# Patient Record
Sex: FEMALE | URBAN - METROPOLITAN AREA
[De-identification: names, ages, dates, MRNs, and addresses within clinical notes are randomized per-mention and may not be internally consistent; named-entity substitution may affect disease eponyms.]

---

## 2021-07-26 ENCOUNTER — OFFICE VISIT (OUTPATIENT)
Dept: URBAN - METROPOLITAN AREA CLINIC 58 | Facility: CLINIC | Age: 51
End: 2021-07-26
Payer: OTHER GOVERNMENT

## 2021-07-26 DIAGNOSIS — H43.811 VITREOUS DEGENERATION, RIGHT EYE: Primary | ICD-10-CM

## 2021-07-26 DIAGNOSIS — H25.13 AGE-RELATED NUCLEAR CATARACT, BILATERAL: ICD-10-CM

## 2021-07-26 PROCEDURE — 99203 OFFICE O/P NEW LOW 30 MIN: CPT | Performed by: OPHTHALMOLOGY

## 2021-07-26 ASSESSMENT — VISUAL ACUITY
OS: 20/20
OD: 20/25

## 2021-07-26 ASSESSMENT — INTRAOCULAR PRESSURE
OD: 16
OS: 16

## 2021-08-23 ENCOUNTER — OFFICE VISIT (OUTPATIENT)
Dept: URBAN - METROPOLITAN AREA CLINIC 58 | Facility: CLINIC | Age: 51
End: 2021-08-23
Payer: OTHER GOVERNMENT

## 2021-08-23 PROCEDURE — 99213 OFFICE O/P EST LOW 20 MIN: CPT | Performed by: OPHTHALMOLOGY

## 2021-08-23 ASSESSMENT — INTRAOCULAR PRESSURE
OD: 13
OS: 14

## 2022-08-23 ENCOUNTER — OFFICE VISIT (OUTPATIENT)
Dept: URBAN - METROPOLITAN AREA CLINIC 58 | Facility: CLINIC | Age: 52
End: 2022-08-23
Payer: COMMERCIAL

## 2022-08-23 DIAGNOSIS — H52.4 PRESBYOPIA: Primary | ICD-10-CM

## 2022-08-23 DIAGNOSIS — H43.811 VITREOUS DETACHMENT OF RIGHT EYE: ICD-10-CM

## 2022-08-23 DIAGNOSIS — H25.13 AGE-RELATED NUCLEAR CATARACT, BILATERAL: ICD-10-CM

## 2022-08-23 PROCEDURE — 92004 COMPRE OPH EXAM NEW PT 1/>: CPT | Performed by: OPTOMETRIST

## 2022-08-23 ASSESSMENT — VISUAL ACUITY
OD: 20/20
OS: 20/20

## 2022-08-23 ASSESSMENT — INTRAOCULAR PRESSURE
OS: 15
OD: 14

## 2022-08-23 NOTE — IMPRESSION/PLAN
Impression: Age-related nuclear cataract, bilateral: H25.13. Plan: Discussed diagnosis in detail with patient. No treatment is required at this time. Will continue to observe condition and or symptoms. Call if [address] worsens.

## 2022-08-23 NOTE — IMPRESSION/PLAN
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 Plan: Discussed diagnosis with Pt. There is no evidence of retinal breaks. Discussed S/S of RD. Pt to RTC if they occur.

## 2022-10-18 ENCOUNTER — APPOINTMENT (RX ONLY)
Dept: URBAN - METROPOLITAN AREA CLINIC 145 | Facility: CLINIC | Age: 52
Setting detail: DERMATOLOGY
End: 2022-10-18

## 2022-10-18 DIAGNOSIS — L43.8 OTHER LICHEN PLANUS: ICD-10-CM | Status: INADEQUATELY CONTROLLED

## 2022-10-18 DIAGNOSIS — L40.0 PSORIASIS VULGARIS: ICD-10-CM

## 2022-10-18 PROCEDURE — ? COUNSELING

## 2022-10-18 PROCEDURE — 99204 OFFICE O/P NEW MOD 45 MIN: CPT

## 2022-10-18 PROCEDURE — ? PRESCRIPTION

## 2022-10-18 PROCEDURE — ? PRESCRIPTION MEDICATION MANAGEMENT

## 2022-10-18 RX ORDER — FLUOCINONIDE GEL 0.5 MG/G
GEL TOPICAL
Qty: 30 | Refills: 0 | Status: ERX | COMMUNITY
Start: 2022-10-18

## 2022-10-18 RX ADMIN — FLUOCINONIDE GEL: 0.5 GEL TOPICAL at 00:00

## 2022-10-18 ASSESSMENT — LOCATION SIMPLE DESCRIPTION DERM
LOCATION SIMPLE: POSTERIOR SCALP
LOCATION SIMPLE: RIGHT BUCCAL MUCOSA
LOCATION SIMPLE: LEFT MAXILLARY GINGIVA
LOCATION SIMPLE: LEFT BUCCAL MUCOSA
LOCATION SIMPLE: LEFT MANDIBULAR GINGIVA

## 2022-10-18 ASSESSMENT — LOCATION DETAILED DESCRIPTION DERM
LOCATION DETAILED: MID-OCCIPITAL SCALP
LOCATION DETAILED: LEFT MEDIAL MAXILLARY GINGIVA
LOCATION DETAILED: LEFT MEDIAL MANDIBULAR GINGIVA
LOCATION DETAILED: RIGHT MIDDLE SUPERIOR BUCCAL MUCOSA
LOCATION DETAILED: LEFT MIDDLE SUPERIOR BUCCAL MUCOSA

## 2022-10-18 ASSESSMENT — LOCATION ZONE DERM
LOCATION ZONE: ORAL_CAVITY
LOCATION ZONE: SCALP

## 2022-10-18 NOTE — PROCEDURE: COUNSELING
Patient Specific Counseling (Will Not Stick From Patient To Patient): Patient is currently treating with tar shampoo and betamethasone solution. She already has both.
Detail Level: Detailed
Detail Level: Simple

## 2022-10-18 NOTE — PROCEDURE: PRESCRIPTION MEDICATION MANAGEMENT
Render In Strict Bullet Format?: No
Plan: Apply fluocinonide gel with her finger TID to the gums and buccal mucosa. Follow up in 1 month. Consider tacrolimus ointment and cyclosporine solution (swish and spit) if fluocinonide gel or tacrolimus are ineffective. Oral isotretinoin has also been reported to help with oral lichen planus & may be considered as a last resort.
Detail Level: Zone
Initiate Treatment: Fluocinonide 0.05% gel

## 2022-12-05 ENCOUNTER — APPOINTMENT (RX ONLY)
Dept: URBAN - METROPOLITAN AREA CLINIC 145 | Facility: CLINIC | Age: 52
Setting detail: DERMATOLOGY
End: 2022-12-05

## 2022-12-05 DIAGNOSIS — L43.8 OTHER LICHEN PLANUS: ICD-10-CM | Status: IMPROVED

## 2022-12-05 PROCEDURE — ? COUNSELING

## 2022-12-05 PROCEDURE — 99214 OFFICE O/P EST MOD 30 MIN: CPT

## 2022-12-05 PROCEDURE — ? PRESCRIPTION

## 2022-12-05 PROCEDURE — ? PRESCRIPTION MEDICATION MANAGEMENT

## 2022-12-05 RX ORDER — CLOBETASOL PROPIONATE 0.5 MG/G
OINTMENT TOPICAL
Qty: 60 | Refills: 0 | Status: ERX | COMMUNITY
Start: 2022-12-05

## 2022-12-05 RX ORDER — TACROLIMUS 1 MG/G
OINTMENT TOPICAL
Qty: 60 | Refills: 1 | Status: ERX | COMMUNITY
Start: 2022-12-05

## 2022-12-05 RX ADMIN — TACROLIMUS: 1 OINTMENT TOPICAL at 00:00

## 2022-12-05 RX ADMIN — CLOBETASOL PROPIONATE: 0.5 OINTMENT TOPICAL at 00:00

## 2022-12-05 ASSESSMENT — LOCATION SIMPLE DESCRIPTION DERM
LOCATION SIMPLE: LEFT BUCCAL MUCOSA
LOCATION SIMPLE: LEFT MANDIBULAR GINGIVA
LOCATION SIMPLE: RIGHT BUCCAL MUCOSA
LOCATION SIMPLE: LEFT MAXILLARY GINGIVA
LOCATION SIMPLE: LABIA MAJORA

## 2022-12-05 ASSESSMENT — LOCATION DETAILED DESCRIPTION DERM
LOCATION DETAILED: LEFT MEDIAL MANDIBULAR GINGIVA
LOCATION DETAILED: LEFT MIDDLE SUPERIOR BUCCAL MUCOSA
LOCATION DETAILED: LEFT LABIUM MAJUS
LOCATION DETAILED: LEFT MEDIAL MAXILLARY GINGIVA
LOCATION DETAILED: RIGHT LABIUM MAJUS
LOCATION DETAILED: RIGHT MIDDLE SUPERIOR BUCCAL MUCOSA

## 2022-12-05 ASSESSMENT — LOCATION ZONE DERM
LOCATION ZONE: ORAL_CAVITY
LOCATION ZONE: VULVA

## 2022-12-05 NOTE — PROCEDURE: COUNSELING
Detail Level: Simple
Detail Level: Detailed
Patient Specific Counseling (Will Not Stick From Patient To Patient): Well controlled with clobetasol, per patient. Medication refilled. I recommend yearly exam of this area. She prefers to follow up wither her Gyn physician for this.

## 2022-12-05 NOTE — PROCEDURE: PRESCRIPTION MEDICATION MANAGEMENT
Render In Strict Bullet Format?: No
Detail Level: Zone
Continue Regimen: Fluocinonide 0.05% gel alternating every 2 weeks with Tacrolimus ointment

## 2023-03-06 ENCOUNTER — APPOINTMENT (RX ONLY)
Dept: URBAN - METROPOLITAN AREA CLINIC 145 | Facility: CLINIC | Age: 53
Setting detail: DERMATOLOGY
End: 2023-03-06

## 2023-03-06 DIAGNOSIS — L43.8 OTHER LICHEN PLANUS: ICD-10-CM | Status: IMPROVED

## 2023-03-06 PROCEDURE — ? COUNSELING

## 2023-03-06 PROCEDURE — 99214 OFFICE O/P EST MOD 30 MIN: CPT

## 2023-03-06 PROCEDURE — ? PRESCRIPTION MEDICATION MANAGEMENT

## 2023-03-06 PROCEDURE — ? TREATMENT REGIMEN

## 2023-03-06 ASSESSMENT — LOCATION DETAILED DESCRIPTION DERM
LOCATION DETAILED: LEFT MEDIAL MANDIBULAR GINGIVA
LOCATION DETAILED: LEFT LABIUM MAJUS
LOCATION DETAILED: LEFT MEDIAL MAXILLARY GINGIVA
LOCATION DETAILED: LEFT MIDDLE SUPERIOR BUCCAL MUCOSA
LOCATION DETAILED: RIGHT LABIUM MAJUS
LOCATION DETAILED: RIGHT MIDDLE SUPERIOR BUCCAL MUCOSA

## 2023-03-06 ASSESSMENT — LOCATION SIMPLE DESCRIPTION DERM
LOCATION SIMPLE: RIGHT BUCCAL MUCOSA
LOCATION SIMPLE: LEFT MAXILLARY GINGIVA
LOCATION SIMPLE: LEFT MANDIBULAR GINGIVA
LOCATION SIMPLE: LEFT BUCCAL MUCOSA
LOCATION SIMPLE: LABIA MAJORA

## 2023-03-06 ASSESSMENT — LOCATION ZONE DERM
LOCATION ZONE: VULVA
LOCATION ZONE: ORAL_CAVITY

## 2023-06-07 ENCOUNTER — APPOINTMENT (RX ONLY)
Dept: URBAN - METROPOLITAN AREA CLINIC 145 | Facility: CLINIC | Age: 53
Setting detail: DERMATOLOGY
End: 2023-06-07

## 2023-06-07 DIAGNOSIS — L43.8 OTHER LICHEN PLANUS: ICD-10-CM

## 2023-06-07 PROCEDURE — ? PRESCRIPTION

## 2023-06-07 PROCEDURE — ? COUNSELING

## 2023-06-07 PROCEDURE — 99213 OFFICE O/P EST LOW 20 MIN: CPT

## 2023-06-07 RX ORDER — CYCLOSPORINE 100 MG/ML
SOLUTION ORAL
Qty: 450 | Refills: 3 | Status: ERX | COMMUNITY
Start: 2023-06-07

## 2023-06-07 RX ADMIN — CYCLOSPORINE: 100 SOLUTION ORAL at 00:00

## 2023-06-07 ASSESSMENT — LOCATION DETAILED DESCRIPTION DERM
LOCATION DETAILED: RIGHT MIDDLE SUPERIOR BUCCAL MUCOSA
LOCATION DETAILED: LEFT MEDIAL MANDIBULAR GINGIVA
LOCATION DETAILED: LEFT MIDDLE SUPERIOR BUCCAL MUCOSA
LOCATION DETAILED: LEFT MEDIAL MAXILLARY GINGIVA

## 2023-06-07 ASSESSMENT — LOCATION ZONE DERM: LOCATION ZONE: ORAL_CAVITY

## 2023-06-07 ASSESSMENT — LOCATION SIMPLE DESCRIPTION DERM
LOCATION SIMPLE: LEFT MANDIBULAR GINGIVA
LOCATION SIMPLE: RIGHT BUCCAL MUCOSA
LOCATION SIMPLE: LEFT BUCCAL MUCOSA
LOCATION SIMPLE: LEFT MAXILLARY GINGIVA

## 2023-06-07 NOTE — PROCEDURE: COUNSELING
Patient Specific Counseling (Will Not Stick From Patient To Patient): Improved, but still has erosions. Trial of oral topical cyclosporine, swish and spit.
Detail Level: Simple